# Patient Record
Sex: FEMALE | Race: AMERICAN INDIAN OR ALASKA NATIVE
[De-identification: names, ages, dates, MRNs, and addresses within clinical notes are randomized per-mention and may not be internally consistent; named-entity substitution may affect disease eponyms.]

---

## 2018-06-18 ENCOUNTER — HOSPITAL ENCOUNTER (OUTPATIENT)
Dept: HOSPITAL 5 - OR | Age: 38
Discharge: HOME | End: 2018-06-18
Attending: PLASTIC SURGERY
Payer: COMMERCIAL

## 2018-06-18 VITALS — DIASTOLIC BLOOD PRESSURE: 85 MMHG | SYSTOLIC BLOOD PRESSURE: 127 MMHG

## 2018-06-18 DIAGNOSIS — N62: Primary | ICD-10-CM

## 2018-06-18 DIAGNOSIS — G47.30: ICD-10-CM

## 2018-06-18 LAB
BASOPHILS # (AUTO): 0 K/MM3 (ref 0–0.1)
BASOPHILS NFR BLD AUTO: 0.7 % (ref 0–1.8)
EOSINOPHIL # BLD AUTO: 0.1 K/MM3 (ref 0–0.4)
EOSINOPHIL NFR BLD AUTO: 1.5 % (ref 0–4.3)
HCT VFR BLD CALC: 42.3 % (ref 30.3–42.9)
HGB BLD-MCNC: 13.6 GM/DL (ref 10.1–14.3)
LYMPHOCYTES # BLD AUTO: 2.1 K/MM3 (ref 1.2–5.4)
LYMPHOCYTES NFR BLD AUTO: 29.8 % (ref 13.4–35)
MCH RBC QN AUTO: 28 PG (ref 28–32)
MCHC RBC AUTO-ENTMCNC: 32 % (ref 30–34)
MCV RBC AUTO: 88 FL (ref 79–97)
MONOCYTES # (AUTO): 0.7 K/MM3 (ref 0–0.8)
MONOCYTES % (AUTO): 10.1 % (ref 0–7.3)
PLATELET # BLD: 268 K/MM3 (ref 140–440)
RBC # BLD AUTO: 4.84 M/MM3 (ref 3.65–5.03)

## 2018-06-18 PROCEDURE — 88305 TISSUE EXAM BY PATHOLOGIST: CPT

## 2018-06-18 PROCEDURE — 81025 URINE PREGNANCY TEST: CPT

## 2018-06-18 PROCEDURE — 85025 COMPLETE CBC W/AUTO DIFF WBC: CPT

## 2018-06-18 PROCEDURE — 36415 COLL VENOUS BLD VENIPUNCTURE: CPT

## 2018-06-18 PROCEDURE — 19318 BREAST REDUCTION: CPT

## 2018-06-18 NOTE — OPERATIVE REPORT
PREOPERATIVE DIAGNOSIS:  Macromastia.



POSTOPERATIVE DIAGNOSIS:  Macromastia.



PROCEDURE:  Bilateral reduction mammoplasty with nipple areolar complex

amputation.



SURGEON:  George Soto M.D.



ASSISTANT:  SHELBI Bustillo.



FINDINGS:  1500 gm removed from the right breast and 1300 gm removed from the

left breast.



DESCRIPTION OF PROCEDURE:  The patient was brought to the operating room and

placed on the table in supine position.  Following administration of general

anesthesia, bilateral breasts were prepped with Betadine solution, draped in

usual sterile manner.  A #10 blade scalpel was used to make a circumareolar skin

incision followed by de-epithelization of inferior dermal pedicle.  Modified

Wise pattern skin markings were incised with scalpel, deepened through

subcutaneous fat and breast tissue using the electrocautery.  Skin flaps were

raised in standard manner as was fashioning of an inferior central mound

pedicle.  Breast tissue was resected inclusive of nipple areolar complexes

bilaterally due to the excessively long inframammary fold to nipple distance and

smoking history.  Hemostasis controlled using electrocautery.  Closure was

performed over 10 mm DAMIEN drains using interrupted and running subcuticular 2-0

Monocryl sutures.  Mastisol, Steri-Strips, and sterile dressings applied.  The

patient tolerated the procedure well and returned to recovery room in stable

condition.





DD: 06/18/2018 18:34

DT: 06/18/2018 19:06

JOB# [id number]  [id number]

FTW/PAUL

## 2018-06-18 NOTE — ANESTHESIA CONSULTATION
Anesthesia Consult and Med Hx


Date of service: 06/18/18





- Airway


Anesthetic Teeth Evaluation: Good


ROM Head & Neck: Adequate


Mental/Hyoid Distance: Adequate


Mallampati Class: Class II


Intubation Access Assessment: Probably Good





- Pulmonary Exam


CTA: Yes





- Cardiac Exam


Cardiac Exam: RRR





- Pre-Operative Health Status


ASA Pre-Surgery Classification: ASA1


Proposed Anesthetic Plan: General





- Central Nervous System


Hx Psychiatric Problems: No





- Other Systems


Hx Alcohol Use: Yes


Hx Substance Use: No


Hx Cancer: No

## 2018-06-18 NOTE — SHORT STAY SUMMARY
Short Stay Documentation


Date of service: 06/18/18





- Allergies and Medications


Current Medications: 


 Allergies





No Known Allergies Allergy (Verified 06/14/18 14:31)


 





 Home Medications











 Medication  Instructions  Recorded  Confirmed  Last Taken  Type


 


No Known Home Medications [No  06/14/18 06/14/18 Unknown History





Reported Home Medications]     














- Brief post op/procedure progress note


Date of procedure: 06/18/18


Pre-op diagnosis: Macromastia


Post-op diagnosis: same


Procedure: 





Saad. Breast Reduction with Nipple Areolar Amputation


Anesthesia: GETA


Surgeon: LESLEY GOVEA JR


Assistant: JOE WRAY


Estimated blood loss: 50-100ml


Specimen disposition: to lab


Condition: stable





- Disposition


Condition at discharge: Good


Disposition: DC-01 TO HOME OR SELFCARE





Short Stay Discharge Plan


Follow up with: 


LESLEY GOVEA JR, MD [Staff Physician] - 7 Days


PRIMARY CARE,MD [Primary Care Provider] - 6 Weeks


Forms:  [facility]

## 2018-06-18 NOTE — OPERATIVE REPORT
PREOPERATIVE DIAGNOSIS:  Macromastia.



POSTOPERATIVE DIAGNOSIS:  Macromastia.



PROCEDURE:  Bilateral reduction mammoplasty.



SURGEON:  George Soto MD



ASSISTANT:  AMBREEN Bustillo



FINDINGS:  700 gm removed from the right breast, 780 gm removed from the left

breast.



DESCRIPTION OF PROCEDURE:  The patient was brought to the operating room and

placed on the table in supine position.  Following administration of general

anesthesia, bilateral breasts were prepped with Betadine solution and draped in

the usual sterile manner.  A #10 blade scalpel was used to make a circumareolar

skin incision followed by de-epithelization of inferior dermal pedicle. 

Modified Wise pattern skin markings were incised with scalpel, deepened through

subcutaneous fat and breast tissue using the electrocautery.  Skin flaps were

raised in standard manner as was fashioning of an inferior central mound

pedicle.  Breast tissue was resected and sent to pathology as specimen. 

Hemostasis controlled using electrocautery.  Closure was performed over 10 mm DAMIEN

drains using interrupted and running subcuticular 2-0 Monocryl sutures. 

Mastisol, Steri-Strips, and sterile dressings applied.  The patient tolerated

the procedure well and returned to recovery room in stable condition.





DD: 06/18/2018 15:56

DT: 06/18/2018 17:06

JOB# [id number]  [id number]

FTW/NTS

## 2018-06-18 NOTE — DISCHARGE SUMMARY
Short Stay Discharge Plan


Activity: no restrictions


Weight Bearing Status: Full Weight Bearing


Diet: regular


Wound: remove dressing (72hrs)


Follow up with: 


PRIMARY CARE,MD [Primary Care Provider] - 6 Weeks


WORK,LESLEY NELSON JR, MD [Staff Physician] - 7 Days


Forms:  [facility]